# Patient Record
Sex: MALE | Race: BLACK OR AFRICAN AMERICAN | NOT HISPANIC OR LATINO | Employment: UNEMPLOYED | URBAN - METROPOLITAN AREA
[De-identification: names, ages, dates, MRNs, and addresses within clinical notes are randomized per-mention and may not be internally consistent; named-entity substitution may affect disease eponyms.]

---

## 2022-10-05 ENCOUNTER — OFFICE VISIT (OUTPATIENT)
Dept: URGENT CARE | Facility: CLINIC | Age: 47
End: 2022-10-05
Payer: MEDICAID

## 2022-10-05 VITALS
BODY MASS INDEX: 23.19 KG/M2 | RESPIRATION RATE: 20 BRPM | WEIGHT: 144.31 LBS | TEMPERATURE: 99 F | DIASTOLIC BLOOD PRESSURE: 82 MMHG | HEART RATE: 81 BPM | OXYGEN SATURATION: 97 % | HEIGHT: 66 IN | SYSTOLIC BLOOD PRESSURE: 127 MMHG

## 2022-10-05 DIAGNOSIS — K08.89 PAIN, DENTAL: Primary | ICD-10-CM

## 2022-10-05 PROCEDURE — 99204 OFFICE O/P NEW MOD 45 MIN: CPT | Mod: PBBFAC | Performed by: FAMILY MEDICINE

## 2022-10-05 PROCEDURE — 99203 OFFICE O/P NEW LOW 30 MIN: CPT | Mod: S$PBB,,, | Performed by: FAMILY MEDICINE

## 2022-10-05 PROCEDURE — 99203 PR OFFICE/OUTPT VISIT, NEW, LEVL III, 30-44 MIN: ICD-10-PCS | Mod: S$PBB,,, | Performed by: FAMILY MEDICINE

## 2022-10-05 RX ORDER — AMOXICILLIN 875 MG/1
875 TABLET, FILM COATED ORAL EVERY 12 HOURS
Qty: 20 TABLET | Refills: 0 | Status: SHIPPED | OUTPATIENT
Start: 2022-10-05 | End: 2022-10-05

## 2022-10-05 RX ORDER — AMOXICILLIN 875 MG/1
875 TABLET, FILM COATED ORAL EVERY 12 HOURS
Qty: 20 TABLET | Refills: 0 | Status: SHIPPED | OUTPATIENT
Start: 2022-10-05 | End: 2022-10-15

## 2022-10-05 RX ORDER — TRAMADOL HYDROCHLORIDE 50 MG/1
50 TABLET ORAL EVERY 6 HOURS PRN
Qty: 12 TABLET | Refills: 0 | Status: SHIPPED | OUTPATIENT
Start: 2022-10-05 | End: 2022-10-05

## 2022-10-05 RX ORDER — TRAMADOL HYDROCHLORIDE 50 MG/1
50 TABLET ORAL EVERY 6 HOURS PRN
Qty: 12 TABLET | Refills: 0 | Status: SHIPPED | OUTPATIENT
Start: 2022-10-05

## 2022-10-05 NOTE — PROGRESS NOTES
"Subjective:       Patient ID: Sebas Abreu is a 46 y.o. male.    Vitals:  height is 5' 6" (1.676 m) and weight is 65.5 kg (144 lb 4.8 oz). His temperature is 98.7 °F (37.1 °C). His blood pressure is 127/82 and his pulse is 81. His respiration is 20 and oxygen saturation is 97%.     Chief Complaint: Dental Pain (3 teeth causing mouth pain )    HPI  Presents to urgent care with several days of dental pain. no sore throat or difficulty swallowing.  No neck pain or stiffness.  No fever.  States he is scheduled to see a dentist.  ROS    Constitutional: negative except as stated in HPI  Eye: negative except as stated in HPI  ENT: negative except as stated in HPI  Respiratory: negative except as stated in HPI  Cardiovascular: negative except as stated in HPI  Gastrointestinal: negative except as stated in HPI  Genitourinary: negative except as stated in HPI  Objective:      Physical Exam    Oral exam  VITAL SIGNS:  Reviewed.      GENERAL:  In no apparent distress  HEAD:  No signs of head trauma  EYES:  Extraocular motions intact  NOSE: No sinus tenderness  MOUTH: Posterior pharynx is clear. small amount of gingival edema right lower molars, no мария abscess.  NECK:  No adenopathy, no JVD         Assessment:       1. Pain, dental            Plan:         Pain, dental    Other orders  -     traMADoL (ULTRAM) 50 mg tablet; Take 1 tablet (50 mg total) by mouth every 6 (six) hours as needed for Pain.  Dispense: 12 tablet; Refill: 0  -     amoxicillin (AMOXIL) 875 MG tablet; Take 1 tablet (875 mg total) by mouth every 12 (twelve) hours. for 10 days  Dispense: 20 tablet; Refill: 0         Prescription for Amoxil.   reviewed, opioid precautions discussed, prescription for a few tramadol.  See dentist   ER precautions            "

## 2023-12-23 ENCOUNTER — HOSPITAL ENCOUNTER (EMERGENCY)
Facility: HOSPITAL | Age: 48
Discharge: HOME | End: 2023-12-23
Attending: EMERGENCY MEDICINE
Payer: MEDICAID

## 2023-12-23 VITALS
WEIGHT: 140 LBS | HEIGHT: 66 IN | HEART RATE: 47 BPM | RESPIRATION RATE: 16 BRPM | DIASTOLIC BLOOD PRESSURE: 91 MMHG | SYSTOLIC BLOOD PRESSURE: 142 MMHG | OXYGEN SATURATION: 99 % | BODY MASS INDEX: 22.5 KG/M2 | TEMPERATURE: 98 F

## 2023-12-23 DIAGNOSIS — K02.9 PAIN DUE TO DENTAL CARIES: Primary | ICD-10-CM

## 2023-12-23 PROCEDURE — 99284 EMERGENCY DEPT VISIT MOD MDM: CPT

## 2023-12-23 RX ORDER — KETOROLAC TROMETHAMINE 10 MG/1
10 TABLET, FILM COATED ORAL EVERY 6 HOURS PRN
Qty: 20 TABLET | Refills: 0 | Status: SHIPPED | OUTPATIENT
Start: 2023-12-23 | End: 2023-12-28

## 2023-12-23 RX ORDER — PENICILLIN V POTASSIUM 500 MG/1
500 TABLET, FILM COATED ORAL 4 TIMES DAILY
Qty: 28 TABLET | Refills: 0 | Status: SHIPPED | OUTPATIENT
Start: 2023-12-23 | End: 2023-12-30

## 2023-12-23 NOTE — ED PROVIDER NOTES
Encounter Date: 12/23/2023    SCRIBE #1 NOTE: I, Claude Maximino, am scribing for, and in the presence of,  Tank Lr MD. I have scribed the following portions of the note - Other sections scribed: HPI, ROS, PE, MDM.       History     Chief Complaint   Patient presents with    Dental Pain     Pt presents with chronic tooth pain on the left side. States its getting worse.      A 47 y/o male with a history of chronic tooth pain presents to [facility] ED with worsening pain to his first molar on the left side since yesterday. Patient is a smoker.     The history is provided by the patient and medical records.     Review of patient's allergies indicates:  No Known Allergies  History reviewed. No pertinent past medical history.  History reviewed. No pertinent surgical history.  Family History   Family history unknown: Yes     Social History     Tobacco Use    Smoking status: Every Day     Current packs/day: 0.50     Types: Cigarettes    Smokeless tobacco: Never   Substance Use Topics    Alcohol use: Not Currently    Drug use: Never     Review of Systems   Constitutional:  Negative for chills, fatigue and fever.   HENT:  Positive for dental problem. Negative for congestion and sore throat.    Eyes:  Negative for visual disturbance.   Respiratory:  Negative for cough and shortness of breath.    Cardiovascular:  Negative for chest pain.   Gastrointestinal:  Negative for abdominal pain, diarrhea, nausea and vomiting.   Genitourinary:  Negative for dysuria.   Musculoskeletal:  Negative for myalgias.   Skin:  Negative for rash.   Neurological:  Negative for weakness, numbness and headaches.       Physical Exam     Initial Vitals [12/23/23 0531]   BP Pulse Resp Temp SpO2   (!) 156/118 62 16 98.3 °F (36.8 °C) 98 %      MAP       --         Physical Exam    Nursing note and vitals reviewed.  Constitutional: No distress.   HENT:   Head: Normocephalic and atraumatic.   Right Ear: Tympanic membrane normal.   Left Ear:  Tympanic membrane normal.   Mouth/Throat: Oropharynx is clear and moist. Dental caries (multiple carious teeth) present.   Tooth #14 is discolored and cracked. Tenderness around the gum line. Local gingivitis. No fluctuance.    Eyes: Conjunctivae and EOM are normal. Pupils are equal, round, and reactive to light.   Neck: Trachea normal. Neck supple. Carotid bruit is not present. No JVD present.   Normal range of motion.  Cardiovascular:  Normal rate and regular rhythm.           No murmur heard.  Pulmonary/Chest: Breath sounds normal. No respiratory distress. He exhibits no tenderness.   Abdominal: Abdomen is soft. Bowel sounds are normal. He exhibits no distension. There is no abdominal tenderness.   Musculoskeletal:         General: Normal range of motion.      Cervical back: Normal range of motion and neck supple.      Lumbar back: Normal. No tenderness. Normal range of motion.     Neurological: He is alert and oriented to person, place, and time. He has normal strength. No cranial nerve deficit or sensory deficit.   Psychiatric: He has a normal mood and affect. Judgment normal.         ED Course   Procedures  Labs Reviewed - No data to display       Imaging Results    None          Medications - No data to display  Medical Decision Making  Differential diagnosis includes but is not limited to dental caries, tooth fracture, and tooth infection.     Risk  Prescription drug management.            Scribe Attestation:   Scribe #1: I performed the above scribed service and the documentation accurately describes the services I performed. I attest to the accuracy of the note.    Attending Attestation:           Physician Attestation for Scribe:  Physician Attestation Statement for Scribe #1: I, Tank Lr MD, reviewed documentation, as scribed by Claude Stanton in my presence, and it is both accurate and complete.             ED Course as of 12/23/23 0745   Sat Dec 23, 2023   0744 Patient without signs of an  abscess, has some tooth decay, will place on antibiotics and NSAID analgesia.  Had a long conversation with patient about need to follow up with dentist as well for possible extraction or root canal.  Understands need to return if worsening. [MP]      ED Course User Index  [MP] Tank Lr MD                           Clinical Impression:  Final diagnoses:  [K02.9] Pain due to dental caries (Primary)          ED Disposition Condition    Discharge Stable          ED Prescriptions       Medication Sig Dispense Start Date End Date Auth. Provider    ketorolac (TORADOL) 10 mg tablet Take 1 tablet (10 mg total) by mouth every 6 (six) hours as needed for Pain. 20 tablet 12/23/2023 12/28/2023 Tank Lr MD    penicillin v potassium (VEETID) 500 MG tablet Take 1 tablet (500 mg total) by mouth 4 (four) times daily. for 7 days 28 tablet 12/23/2023 12/30/2023 Tank Lr MD          Follow-up Information       Follow up With Specialties Details Why Contact Info    [facility] Emergency Medicine Go to  If symptoms worsen, As needed [address] [facility] [address] [phone]             Tank Lr MD  12/23/23 2112

## 2024-02-06 ENCOUNTER — HOSPITAL ENCOUNTER (EMERGENCY)
Facility: HOSPITAL | Age: 49
Discharge: PSYCHIATRIC OR SUBSTANCE USE FACILITY | End: 2024-02-06
Attending: INTERNAL MEDICINE
Payer: MEDICAID

## 2024-02-06 VITALS
WEIGHT: 139.75 LBS | TEMPERATURE: 97 F | HEART RATE: 84 BPM | HEIGHT: 66 IN | OXYGEN SATURATION: 100 % | SYSTOLIC BLOOD PRESSURE: 126 MMHG | RESPIRATION RATE: 16 BRPM | DIASTOLIC BLOOD PRESSURE: 83 MMHG | BODY MASS INDEX: 22.46 KG/M2

## 2024-02-06 DIAGNOSIS — N39.0 URINARY TRACT INFECTION WITHOUT HEMATURIA, SITE UNSPECIFIED: Primary | ICD-10-CM

## 2024-02-06 DIAGNOSIS — Z00.8 MEDICAL CLEARANCE FOR PSYCHIATRIC ADMISSION: ICD-10-CM

## 2024-02-06 LAB
ALBUMIN SERPL-MCNC: 4.1 G/DL (ref 3.5–5)
ALBUMIN/GLOB SERPL: 1 RATIO (ref 1.1–2)
ALP SERPL-CCNC: 94 UNIT/L (ref 40–150)
ALT SERPL-CCNC: 53 UNIT/L (ref 0–55)
AMPHET UR QL SCN: NEGATIVE
APPEARANCE UR: CLEAR
AST SERPL-CCNC: 163 UNIT/L (ref 5–34)
BACTERIA #/AREA URNS AUTO: ABNORMAL /HPF
BARBITURATE SCN PRESENT UR: NEGATIVE
BASOPHILS # BLD AUTO: 0.04 X10(3)/MCL
BASOPHILS NFR BLD AUTO: 0.6 %
BENZODIAZ UR QL SCN: NEGATIVE
BILIRUB SERPL-MCNC: 1.8 MG/DL
BILIRUB UR QL STRIP.AUTO: NEGATIVE
BUN SERPL-MCNC: 12.2 MG/DL (ref 8.9–20.6)
C TRACH DNA SPEC QL NAA+PROBE: NOT DETECTED
CALCIUM SERPL-MCNC: 9.6 MG/DL (ref 8.4–10.2)
CANNABINOIDS UR QL SCN: POSITIVE
CHLORIDE SERPL-SCNC: 106 MMOL/L (ref 98–107)
CO2 SERPL-SCNC: 23 MMOL/L (ref 22–29)
COCAINE UR QL SCN: NEGATIVE
COLOR UR AUTO: YELLOW
CREAT SERPL-MCNC: 0.95 MG/DL (ref 0.73–1.18)
EOSINOPHIL # BLD AUTO: 0.12 X10(3)/MCL (ref 0–0.9)
EOSINOPHIL NFR BLD AUTO: 1.9 %
ERYTHROCYTE [DISTWIDTH] IN BLOOD BY AUTOMATED COUNT: 13.3 % (ref 11.5–17)
ETHANOL SERPL-MCNC: <10 MG/DL
FENTANYL UR QL SCN: NEGATIVE
GFR SERPLBLD CREATININE-BSD FMLA CKD-EPI: >60 MLS/MIN/1.73/M2
GLOBULIN SER-MCNC: 4.2 GM/DL (ref 2.4–3.5)
GLUCOSE SERPL-MCNC: 90 MG/DL (ref 74–100)
GLUCOSE UR QL STRIP.AUTO: NORMAL
HCT VFR BLD AUTO: 39.1 % (ref 42–52)
HGB BLD-MCNC: 14.3 G/DL (ref 14–18)
HOLD SPECIMEN: NORMAL
HOLD SPECIMEN: NORMAL
HYALINE CASTS #/AREA URNS LPF: ABNORMAL /LPF
IMM GRANULOCYTES # BLD AUTO: 0.01 X10(3)/MCL (ref 0–0.04)
IMM GRANULOCYTES NFR BLD AUTO: 0.2 %
KETONES UR QL STRIP.AUTO: ABNORMAL
LEUKOCYTE ESTERASE UR QL STRIP.AUTO: 75
LYMPHOCYTES # BLD AUTO: 2.04 X10(3)/MCL (ref 0.6–4.6)
LYMPHOCYTES NFR BLD AUTO: 32.3 %
MCH RBC QN AUTO: 36.2 PG (ref 27–31)
MCHC RBC AUTO-ENTMCNC: 36.6 G/DL (ref 33–36)
MCV RBC AUTO: 99 FL (ref 80–94)
MDMA UR QL SCN: NEGATIVE
MONOCYTES # BLD AUTO: 0.7 X10(3)/MCL (ref 0.1–1.3)
MONOCYTES NFR BLD AUTO: 11.1 %
MUCOUS THREADS URNS QL MICRO: ABNORMAL /LPF
N GONORRHOEA DNA SPEC QL NAA+PROBE: NOT DETECTED
NEUTROPHILS # BLD AUTO: 3.41 X10(3)/MCL (ref 2.1–9.2)
NEUTROPHILS NFR BLD AUTO: 53.9 %
NITRITE UR QL STRIP.AUTO: NEGATIVE
NRBC BLD AUTO-RTO: 0 %
OHS QRS DURATION: 76 MS
OHS QTC CALCULATION: 430 MS
OPIATES UR QL SCN: NEGATIVE
PCP UR QL: NEGATIVE
PH UR STRIP.AUTO: 6 [PH]
PH UR: 6 [PH] (ref 3–11)
PLATELET # BLD AUTO: 204 X10(3)/MCL (ref 130–400)
PMV BLD AUTO: 9.4 FL (ref 7.4–10.4)
POTASSIUM SERPL-SCNC: 3.9 MMOL/L (ref 3.5–5.1)
PROT SERPL-MCNC: 8.3 GM/DL (ref 6.4–8.3)
PROT UR QL STRIP.AUTO: ABNORMAL
RBC # BLD AUTO: 3.95 X10(6)/MCL (ref 4.7–6.1)
RBC #/AREA URNS AUTO: ABNORMAL /HPF
RBC UR QL AUTO: ABNORMAL
SODIUM SERPL-SCNC: 139 MMOL/L (ref 136–145)
SOURCE (OHS): NORMAL
SP GR UR STRIP.AUTO: 1.03 (ref 1–1.03)
SPECIFIC GRAVITY, URINE AUTO (.000) (OHS): 1.03 (ref 1–1.03)
SQUAMOUS #/AREA URNS LPF: ABNORMAL /HPF
TSH SERPL-ACNC: 1.23 UIU/ML (ref 0.35–4.94)
UROBILINOGEN UR STRIP-ACNC: NORMAL
WBC # SPEC AUTO: 6.32 X10(3)/MCL (ref 4.5–11.5)
WBC #/AREA URNS AUTO: ABNORMAL /HPF

## 2024-02-06 PROCEDURE — 80307 DRUG TEST PRSMV CHEM ANLYZR: CPT | Performed by: INTERNAL MEDICINE

## 2024-02-06 PROCEDURE — 85025 COMPLETE CBC W/AUTO DIFF WBC: CPT | Performed by: INTERNAL MEDICINE

## 2024-02-06 PROCEDURE — 87491 CHLMYD TRACH DNA AMP PROBE: CPT | Performed by: INTERNAL MEDICINE

## 2024-02-06 PROCEDURE — 81001 URINALYSIS AUTO W/SCOPE: CPT | Mod: XB | Performed by: INTERNAL MEDICINE

## 2024-02-06 PROCEDURE — 84443 ASSAY THYROID STIM HORMONE: CPT | Performed by: INTERNAL MEDICINE

## 2024-02-06 PROCEDURE — 80053 COMPREHEN METABOLIC PANEL: CPT | Performed by: INTERNAL MEDICINE

## 2024-02-06 PROCEDURE — 87086 URINE CULTURE/COLONY COUNT: CPT | Performed by: INTERNAL MEDICINE

## 2024-02-06 PROCEDURE — 82077 ASSAY SPEC XCP UR&BREATH IA: CPT | Performed by: INTERNAL MEDICINE

## 2024-02-06 PROCEDURE — 99285 EMERGENCY DEPT VISIT HI MDM: CPT | Mod: 25

## 2024-02-06 PROCEDURE — 25000003 PHARM REV CODE 250: Performed by: INTERNAL MEDICINE

## 2024-02-06 PROCEDURE — 93005 ELECTROCARDIOGRAM TRACING: CPT

## 2024-02-06 RX ORDER — CHLORDIAZEPOXIDE HYDROCHLORIDE 25 MG/1
CAPSULE, GELATIN COATED ORAL
COMMUNITY
Start: 2023-10-16

## 2024-02-06 RX ORDER — IBUPROFEN 200 MG
1 TABLET ORAL DAILY
Status: DISCONTINUED | OUTPATIENT
Start: 2024-02-06 | End: 2024-02-06 | Stop reason: HOSPADM

## 2024-02-06 RX ORDER — DOXYCYCLINE HYCLATE 100 MG
100 TABLET ORAL EVERY 12 HOURS
Status: DISCONTINUED | OUTPATIENT
Start: 2024-02-06 | End: 2024-02-06 | Stop reason: HOSPADM

## 2024-02-06 RX ORDER — ONDANSETRON 4 MG/1
4 TABLET, ORALLY DISINTEGRATING ORAL
Status: COMPLETED | OUTPATIENT
Start: 2024-02-06 | End: 2024-02-06

## 2024-02-06 RX ADMIN — ONDANSETRON 4 MG: 4 TABLET, ORALLY DISINTEGRATING ORAL at 11:02

## 2024-02-06 RX ADMIN — DOXYCYCLINE HYCLATE 100 MG: 100 TABLET, COATED ORAL at 11:02

## 2024-02-06 NOTE — ED PROVIDER NOTES
"Encounter Date: 2/6/2024       History     Chief Complaint   Patient presents with    Psychiatric Evaluation     Reports hearing voices, states the voices sometimes it tells him to hurt himself, denies any current SI or HI, hx of schizophrenia, has not been taking meds     Presents with auditory hallucinations and suicidal ideations. Admits not taking his schizophrenia medications and using alcohol. States have been a "little bit" since he took his medications or spoke to a psychiatrist.    The history is provided by the patient.     Review of patient's allergies indicates:  No Known Allergies  Past Medical History:   Diagnosis Date    Schizophrenia, unspecified      History reviewed. No pertinent surgical history.  Family History   Family history unknown: Yes     Social History     Tobacco Use    Smoking status: Every Day     Current packs/day: 0.50     Types: Cigarettes    Smokeless tobacco: Never   Substance Use Topics    Alcohol use: Not Currently    Drug use: Never     Review of Systems   Psychiatric/Behavioral:  Positive for suicidal ideas.    All other systems reviewed and are negative.      Physical Exam     Initial Vitals [02/06/24 0934]   BP Pulse Resp Temp SpO2   (!) 139/93 60 18 97 °F (36.1 °C) 99 %      MAP       --         Physical Exam    Nursing note and vitals reviewed.  Constitutional: He appears well-developed.   HENT:   Head: Normocephalic and atraumatic.   Eyes: Conjunctivae and EOM are normal. Pupils are equal, round, and reactive to light.   Neck: Neck supple. No thyromegaly present. No JVD present.   Normal range of motion.  Cardiovascular:  Normal heart sounds and intact distal pulses.           Irregular Irregular, tachycardic   Pulmonary/Chest: Breath sounds normal. No respiratory distress.   Abdominal: Abdomen is soft. Bowel sounds are normal. He exhibits no distension. There is no abdominal tenderness. There is no rebound and no guarding.   Musculoskeletal:         General: No edema. " Normal range of motion.      Cervical back: Normal range of motion and neck supple.     Neurological: He is alert and oriented to person, place, and time. He has normal strength. GCS score is 15. GCS eye subscore is 4. GCS verbal subscore is 5. GCS motor subscore is 6.   Skin: Skin is warm and dry. No rash noted.   Psychiatric: His speech is normal. His affect is blunt. He is slowed and actively hallucinating. Thought content is delusional. Cognition and memory are impaired. He expresses inappropriate judgment. He expresses suicidal ideation. He is attentive.         ED Course   Procedures  Labs Reviewed   CBC W/ AUTO DIFFERENTIAL    Narrative:     The following orders were created for panel order CBC auto differential.  Procedure                               Abnormality         Status                     ---------                               -----------         ------                     CBC with Differential[[id number]]                                                        Please view results for these tests on the individual orders.   COMPREHENSIVE METABOLIC PANEL   TSH   URINALYSIS, REFLEX TO URINE CULTURE   DRUG SCREEN, URINE (BEAKER)   ALCOHOL,MEDICAL (ETHANOL)   CBC WITH DIFFERENTIAL     EKG Readings: (Independently Interpreted)   Rhythm: Sinus Tachycardia. Heart Rate: 124. Ectopy: Frequent PACs. Conduction: Normal. ST Segments: Normal ST Segments. T Waves: Normal. Axis: Normal. Clinical Impression: Sinus Tachycardia with PACs Other Impression: IA: 0.16     ECG Results              EKG 12-lead (Final result)        Collection Time Result Time QRS Duration [facility] QTC Calculation    02/06/24 09:52:07 02/06/24 09:59:27 76 430                     Final result by Interface, Lab In [facility] (02/06/24 09:59:32)                   Narrative:    Test Reason : Z00.8,    Vent. Rate : 125 BPM     Atrial Rate : 326 BPM     P-R Int : 000 ms          QRS Dur : 076 ms      QT Int : 298 ms       P-R-T Axes : 000 088 084  degrees     QTc Int : 430 ms    Atrial fibrillation with rapid ventricular response  Voltage criteria for left ventricular hypertrophy  Abnormal ECG  No previous ECGs available  Confirmed by Chandra Kelly MD ([id number]) on 2/6/2024 9:59:24 AM    Referred By: TOMAS   SELF           Confirmed By:Chandra Kelly MD                                  Imaging Results    None          Medications   nicotine 14 mg/24 hr 1 patch (has no administration in time range)     Medical Decision Making  Amount and/or Complexity of Data Reviewed  Labs: ordered. Decision-making details documented in ED Course.  ECG/medicine tests: ordered and independent interpretation performed. Decision-making details documented in ED Course.    Risk  OTC drugs.      Additional MDM:   Differential Diagnosis:   Schizophrenia exacerbation, bipolar psychosis, drug induced psychosis, thyrotoxicosis, renal failure, liver failure, toxydrome, among others                  Medically cleared for psychiatry placement: 2/6/2024 10:16 AM                   Clinical Impression:  Final diagnoses:  [Z00.8] Medical clearance for psychiatric admission          ED Disposition Condition    Transfer to Psych Facility Stable          ED Prescriptions    None       Follow-up Information    None          German Gonzalez MD  02/06/24 1019

## 2024-02-08 LAB — BACTERIA UR CULT: NO GROWTH

## 2024-11-18 ENCOUNTER — OFFICE VISIT (OUTPATIENT)
Dept: URGENT CARE | Facility: CLINIC | Age: 49
End: 2024-11-18
Payer: MEDICAID

## 2024-11-18 VITALS
BODY MASS INDEX: 25.39 KG/M2 | OXYGEN SATURATION: 99 % | HEIGHT: 66 IN | HEART RATE: 76 BPM | DIASTOLIC BLOOD PRESSURE: 87 MMHG | SYSTOLIC BLOOD PRESSURE: 126 MMHG | WEIGHT: 158 LBS | RESPIRATION RATE: 18 BRPM | TEMPERATURE: 98 F

## 2024-11-18 DIAGNOSIS — H61.23 BILATERAL IMPACTED CERUMEN: ICD-10-CM

## 2024-11-18 DIAGNOSIS — H60.8X2 OTHER OTITIS EXTERNA, LEFT EAR: ICD-10-CM

## 2024-11-18 DIAGNOSIS — H61.20 IMPACTED CERUMEN, UNSPECIFIED LATERALITY: ICD-10-CM

## 2024-11-18 DIAGNOSIS — R05.9 COUGH, UNSPECIFIED TYPE: Primary | ICD-10-CM

## 2024-11-18 PROCEDURE — 99214 OFFICE O/P EST MOD 30 MIN: CPT | Mod: PBBFAC | Performed by: NURSE PRACTITIONER

## 2024-11-18 PROCEDURE — 99204 OFFICE O/P NEW MOD 45 MIN: CPT | Mod: S$PBB,,, | Performed by: NURSE PRACTITIONER

## 2024-11-18 RX ORDER — BENZONATATE 200 MG/1
200 CAPSULE ORAL 3 TIMES DAILY PRN
Qty: 30 CAPSULE | Refills: 0 | Status: SHIPPED | OUTPATIENT
Start: 2024-11-18

## 2024-11-18 RX ORDER — OFLOXACIN 3 MG/ML
10 SOLUTION AURICULAR (OTIC) DAILY
Qty: 5 ML | Refills: 0 | Status: SHIPPED | OUTPATIENT
Start: 2024-11-18 | End: 2024-11-25

## 2024-11-18 RX ORDER — PROMETHAZINE HYDROCHLORIDE AND DEXTROMETHORPHAN HYDROBROMIDE 6.25; 15 MG/5ML; MG/5ML
10 SYRUP ORAL
Qty: 120 ML | Refills: 0 | Status: SHIPPED | OUTPATIENT
Start: 2024-11-18

## 2024-11-18 RX ORDER — PENICILLIN V POTASSIUM 500 MG/1
500 TABLET, FILM COATED ORAL 4 TIMES DAILY
COMMUNITY
Start: 2024-10-17

## 2024-11-18 RX ORDER — ESCITALOPRAM OXALATE 10 MG/1
10 TABLET ORAL
COMMUNITY
Start: 2024-10-05

## 2024-11-18 NOTE — PROGRESS NOTES
"Subjective:      Patient ID: Sebas Abreu is a 49 y.o. male.    Vitals:  height is 5' 6" (1.676 m) and weight is 71.7 kg (158 lb). His temperature is 98.2 °F (36.8 °C). His blood pressure is 126/87 and his pulse is 76. His respiration is 18 and oxygen saturation is 99%.     Chief Complaint: Cough (Pt c/o productive cough x 2-3 days,  bilateral clogged ears x 1 week /OTC Mucinex )    Cough     as stated in CC. Pt denies sore throat shortness of breath, headache, dizziness, fever, chest pain, stomach pain, n/v/d.     Respiratory:  Positive for cough.       Objective:     Physical Exam   Constitutional: He is oriented to person, place, and time. He appears well-developed.  Non-toxic appearance. He does not appear ill. No distress.   HENT:   Head: Normocephalic and atraumatic.   Ears:   Right Ear: Hearing normal. No swelling or tenderness. Tympanic membrane is not injected, not erythematous and not bulging. No middle ear effusion. impacted cerumen  Left Ear: There is swelling and tenderness. Tympanic membrane is not injected, not erythematous and not bulging. impacted cerumen  Nose: No purulent discharge. Right sinus exhibits no maxillary sinus tenderness and no frontal sinus tenderness. Left sinus exhibits no maxillary sinus tenderness and no frontal sinus tenderness.   Mouth/Throat: Uvula is midline. Mucous membranes are moist. No oropharyngeal exudate.   Bilateral cerumen impaction removed with irrigation.  Patient tolerated well there is a left ear canal swelling and tenderness after procedure no erythematous tympanic membrane or bulging tympanic membrane      Comments: Bilateral cerumen impaction removed with irrigation.  Patient tolerated well there is a left ear canal swelling and tenderness after procedure no erythematous tympanic membrane or bulging tympanic membrane  Eyes: Conjunctivae are normal. Right eye exhibits no discharge. Left eye exhibits no discharge. Extraocular movement intact   Neck: Neck supple. " No neck rigidity present.   Cardiovascular: Normal rate and regular rhythm.   Pulmonary/Chest: Effort normal and breath sounds normal. No respiratory distress. He has no wheezes. He has no rhonchi. He has no rales.   Abdominal: Normal appearance.   Musculoskeletal:      Cervical back: He exhibits no tenderness.   Lymphadenopathy:     He has no cervical adenopathy.   Neurological: He is alert and oriented to person, place, and time.   Skin: Skin is warm, dry and not diaphoretic. Capillary refill takes less than 2 seconds.   Psychiatric: His behavior is normal. Mood, judgment and thought content normal.   Nursing note and vitals reviewed.      Assessment:     1. Cough, unspecified type    2. Other otitis externa, left ear    3. Bilateral impacted cerumen    4. Impacted cerumen, unspecified laterality        Plan:   No indication for testing today   ER precautions given and discussed  Prescription management for cough and ear cerumen impaction  OTC cold/flu products as desired for symptoms  - Plenty of fluids  - Humidified air  - Tylenol or Motrin for pain/fever  Debrox sent to pharmacy and educated on ear wax maintenance  Tessalon for productive cough.   Drink plenty fluids stay hydrated    Cough, unspecified type  -     benzonatate (TESSALON) 200 MG capsule; Take 1 capsule (200 mg total) by mouth 3 (three) times daily as needed for Cough.  Dispense: 30 capsule; Refill: 0  -     promethazine-dextromethorphan (PROMETHAZINE-DM) 6.25-15 mg/5 mL Syrp; Take 10 mLs by mouth every 6 to 8 hours as needed (Cough). May cause sedation.  Do not drive or operate heavy machinery after taking this medication.  Dispense: 120 mL; Refill: 0    Other otitis externa, left ear  -     ofloxacin (FLOXIN) 0.3 % otic solution; Place 10 drops into the left ear once daily. for 7 days  Dispense: 5 mL; Refill: 0    Bilateral impacted cerumen  -     carbamide peroxide (DEBROX) 6.5 % otic solution; Place 5 drops into both ears 2 (two) times daily.  for 7 days  Dispense: 15 mL; Refill: 0  -     Ear wax removal    Impacted cerumen, unspecified laterality

## 2024-11-18 NOTE — PATIENT INSTRUCTIONS
Please follow instructions on patient education material.      Return to urgent care in 2 to 3 days if symptoms are not improving, immediately if you develop any new or worsening symptoms.     - OTC cold/flu products as desired for symptoms  - Plenty of fluids  - Humidified air  - Tylenol or Motrin for pain/fever    See patient education.  Debrox Rx was sent to your pharmacy, however this is an over-the-counter medication and can be purchased if not covered by your insurance. Use this for maintenance of wax. As directed on packaging for 4 days in a row, once per month.  Stop using Qtips to remove wax from your ears.   If anitbiotic ear drops were prescribed for you, start those today.     Go to the ER if you experience chest pain with shortness of breath, shortness of breath when moving around your house, high fevers 103.0+, excessive vomiting/diarrhea, or general distress.

## 2024-11-18 NOTE — LETTER
November 18, 2024      [facility]  [facility] [facility] [address]  Phone: [phone]       Patient: Sebas Abreu   YOB: 1975  Date of Visit: 11/18/2024    To Whom It May Concern:    Dionicio Abreu  was at [facility] on 11/18/2024. The patient may return to work/school on 11/19/24 with no restrictions. If you have any questions or concerns, or if I can be of further assistance, please do not hesitate to contact me.    Sincerely,    ALBARO Polk

## 2025-01-25 ENCOUNTER — OFFICE VISIT (OUTPATIENT)
Dept: URGENT CARE | Facility: CLINIC | Age: 50
End: 2025-01-25
Payer: MEDICAID

## 2025-01-25 VITALS
WEIGHT: 162.13 LBS | BODY MASS INDEX: 26.06 KG/M2 | SYSTOLIC BLOOD PRESSURE: 132 MMHG | OXYGEN SATURATION: 100 % | DIASTOLIC BLOOD PRESSURE: 86 MMHG | HEART RATE: 69 BPM | TEMPERATURE: 98 F | RESPIRATION RATE: 18 BRPM | HEIGHT: 66 IN

## 2025-01-25 DIAGNOSIS — Z00.00 ENCOUNTER FOR MEDICAL EXAMINATION TO ESTABLISH CARE: ICD-10-CM

## 2025-01-25 DIAGNOSIS — R09.89 SYMPTOMS OF UPPER RESPIRATORY INFECTION (URI): Primary | ICD-10-CM

## 2025-01-25 LAB
CTP QC/QA: YES
CTP QC/QA: YES
POC MOLECULAR INFLUENZA A AGN: NEGATIVE
POC MOLECULAR INFLUENZA B AGN: NEGATIVE
SARS-COV-2 AG RESP QL IA.RAPID: NEGATIVE

## 2025-01-25 PROCEDURE — 99213 OFFICE O/P EST LOW 20 MIN: CPT | Mod: S$PBB,,,

## 2025-01-25 PROCEDURE — 87811 SARS-COV-2 COVID19 W/OPTIC: CPT | Mod: PBBFAC

## 2025-01-25 PROCEDURE — 99215 OFFICE O/P EST HI 40 MIN: CPT | Mod: PBBFAC

## 2025-01-25 PROCEDURE — 87502 INFLUENZA DNA AMP PROBE: CPT | Mod: PBBFAC

## 2025-01-25 RX ORDER — CETIRIZINE HYDROCHLORIDE 10 MG/1
10 TABLET ORAL DAILY
Qty: 20 TABLET | Refills: 1 | Status: SHIPPED | OUTPATIENT
Start: 2025-01-25

## 2025-01-25 RX ORDER — PROMETHAZINE HYDROCHLORIDE AND DEXTROMETHORPHAN HYDROBROMIDE 6.25; 15 MG/5ML; MG/5ML
5 SYRUP ORAL EVERY 4 HOURS PRN
Qty: 118 ML | Refills: 0 | Status: SHIPPED | OUTPATIENT
Start: 2025-01-25 | End: 2025-02-04

## 2025-01-25 NOTE — PROGRESS NOTES
"Subjective:       Patient ID: Sebas Abreu is a 49 y.o. male.    Vitals:  height is 5' 6" (1.676 m) and weight is 73.5 kg (162 lb 1.6 oz). His oral temperature is 98.2 °F (36.8 °C). His blood pressure is 132/86 and his pulse is 69. His respiration is 18 and oxygen saturation is 100%.     Chief Complaint: URI (Patient states nasal congestion, cough, HA x 2 days. States cough is worse with laying down and mouth hurts upper right side. Denies fever or sore throat.)    49-year-old male presents to the clinic with complaints of nasal congestion cough and headache that began 2 days ago.  Patient states that his cough is worse when lying down and worse at night and patient also states that he is having dental pain on his right upper side that he feels is due to his congestion.  Patient denies fever or sore throat and denies fatigue, body aches, chills, rash.    All other systems are negative    Chart reviewed    Objective:   Physical Exam   Constitutional: He appears well-developed.  Non-toxic appearance. He does not appear ill. No distress.   HENT:   Head: Normocephalic and atraumatic.   Ears:   Right Ear: Hearing, tympanic membrane, external ear and ear canal normal.   Left Ear: Hearing, tympanic membrane, external ear and ear canal normal.   Nose: Mucosal edema and rhinorrhea present. No purulent discharge. Right sinus exhibits no maxillary sinus tenderness and no frontal sinus tenderness. Left sinus exhibits no maxillary sinus tenderness and no frontal sinus tenderness.   Mouth/Throat: Uvula is midline. Posterior oropharyngeal edema and posterior oropharyngeal erythema present.   Eyes: Right eye exhibits no discharge. Left eye exhibits no discharge. Extraocular movement intact   Neck: Neck supple. No neck rigidity present.   Cardiovascular: Regular rhythm.   Pulmonary/Chest: Effort normal and breath sounds normal. No respiratory distress. He has no wheezes. He has no rhonchi. He has no rales.   Lymphadenopathy:     He " has no cervical adenopathy.   Neurological: He is alert.   Skin: Skin is warm, dry and not diaphoretic.   Psychiatric: His behavior is normal.   Nursing note and vitals reviewed.      Assessment:     1. Symptoms of upper respiratory infection (URI)    2. Encounter for medical examination to establish care          Results for orders placed or performed in visit on 01/25/25   POCT Influenza A/B Molecular    Collection Time: 01/25/25 11:42 AM   Result Value Ref Range    POC Molecular Influenza A Ag Negative Negative    POC Molecular Influenza B Ag Negative Negative     Acceptable Yes    SARS Coronavirus 2 Antigen, POCT Manual Read    Collection Time: 01/25/25 11:42 AM   Result Value Ref Range    SARS Coronavirus 2 Antigen Negative Negative     Acceptable Yes        Plan:   Follow-up with family practice at [facility] as soon as possible referral placed  Discussed negative COVID and flu results  Please follow-up discharge instructions carefully  Please get plenty of rest and drink plenty of fluids  Begin taking Zyrtec for congestion  Began using promethazine DM for nighttime cough      Symptoms of upper respiratory infection (URI)  -     POCT Influenza A/B Molecular  -     SARS Coronavirus 2 Antigen, POCT Manual Read  -     promethazine-dextromethorphan (PROMETHAZINE-DM) 6.25-15 mg/5 mL Syrp; Take 5 mLs by mouth every 4 (four) hours as needed (cough).  Dispense: 118 mL; Refill: 0  -     cetirizine (ZYRTEC) 10 MG tablet; Take 1 tablet (10 mg total) by mouth once daily.  Dispense: 20 tablet; Refill: 1    Encounter for medical examination to establish care  -     Ambulatory referral/consult to Family Practice        Please note: This chart was completed via voice to text dictation. It may contain typographical/word recognition errors. If there are any questions, please contact the provider for final clarification.

## 2025-01-25 NOTE — LETTER
January 25, 2025      [facility]  [facility] [facility] [address]  Phone: [phone]       Patient: Sebas Abreu   YOB: 1975  Date of Visit: 01/25/2025    To Whom It May Concern:    Dionicio Abreu  was at [facility] on 01/25/2025. The patient may return to work/school on 1/26/2025 with no restrictions. If you have any questions or concerns, or if I can be of further assistance, please do not hesitate to contact me.    Sincerely,    GISELA Arthur